# Patient Record
Sex: MALE | Race: BLACK OR AFRICAN AMERICAN | NOT HISPANIC OR LATINO | URBAN - NONMETROPOLITAN AREA
[De-identification: names, ages, dates, MRNs, and addresses within clinical notes are randomized per-mention and may not be internally consistent; named-entity substitution may affect disease eponyms.]

---

## 2019-01-13 ENCOUNTER — HOSPITAL ENCOUNTER (EMERGENCY)
Facility: HOSPITAL | Age: 29
Discharge: HOME | End: 2019-01-13
Attending: EMERGENCY MEDICINE | Admitting: EMERGENCY MEDICINE

## 2019-01-13 VITALS
RESPIRATION RATE: 18 BRPM | HEART RATE: 85 BPM | OXYGEN SATURATION: 98 % | HEIGHT: 68 IN | TEMPERATURE: 97.7 F | DIASTOLIC BLOOD PRESSURE: 69 MMHG | SYSTOLIC BLOOD PRESSURE: 127 MMHG | WEIGHT: 228.4 LBS | BODY MASS INDEX: 34.62 KG/M2

## 2019-01-13 DIAGNOSIS — J02.9 VIRAL PHARYNGITIS: Primary | ICD-10-CM

## 2019-01-13 LAB — S PYO AG THROAT QL: NEGATIVE

## 2019-01-13 PROCEDURE — 87880 STREP A ASSAY W/OPTIC: CPT | Performed by: EMERGENCY MEDICINE

## 2019-01-13 PROCEDURE — 87081 CULTURE SCREEN ONLY: CPT | Performed by: EMERGENCY MEDICINE

## 2019-01-13 PROCEDURE — 99283 EMERGENCY DEPT VISIT LOW MDM: CPT

## 2019-01-13 NOTE — DISCHARGE INSTRUCTIONS
Follow up with one of the [facility] physician groups below to setup primary care. If you have trouble following up, please call the [facility] Nurse Line at [phone]    (Dr. Guanako Pandey DO, Dr. Jason Elaine DO,  PERI Ruelas, and PERI Delaney)  [facility], Primary Care   [address] [address], [address], [place], [place] [address] [phone]     (Dr. Bonnie Tejeda MD, PERI Nunez, and PERI Ortiz)  [facility], Primary Care   [address] [address], [place] [address] [phone]    (Dr. Gautam Verdugo MD and Dr. Fletcher Hayes MD)  [facility], Primary Care  [address], [address] [phone]    (Dr. Velasquez Humphreys MD)  [facility], Primary Care  [address], [address], [place], [place], [address] [phone]

## 2019-01-13 NOTE — ED PROVIDER NOTES
Subjective   Patient is a 28-year-old male who presents to the ER with a sore throat.  Patient states he has had a sore throat for the last week.  Patient says he has also had a very mild associated cough.  Patient states his throat mainly hurts with eating and drinking.  He is able to swallow without difficulty though.  He denies any fever, chills, chest pain, shortness of air, abdominal pain, nausea vomiting diarrhea, urinary changes, neurological changes, trouble breathing or swallowing, sick contacts, voice changes.  Patient has no other concerns.            Review of Systems   Constitutional: Negative.    HENT: Positive for sore throat.    Eyes: Negative.    Respiratory: Negative.    Cardiovascular: Negative.    Gastrointestinal: Negative.    Endocrine: Negative.    Genitourinary: Negative.    Musculoskeletal: Negative.    Skin: Negative.    Allergic/Immunologic: Negative.    Neurological: Negative.    Hematological: Negative.    Psychiatric/Behavioral: Negative.    All other systems reviewed and are negative.      History reviewed. No pertinent past medical history.    No Known Allergies    History reviewed. No pertinent surgical history.    History reviewed. No pertinent family history.    Social History     Socioeconomic History   • Marital status: Single     Spouse name: Not on file   • Number of children: Not on file   • Years of education: Not on file   • Highest education level: Not on file   Tobacco Use   • Smoking status: Current Every Day Smoker   Substance and Sexual Activity   • Alcohol use: No     Frequency: Never   • Drug use: No           Objective   Physical Exam   Constitutional: He is oriented to person, place, and time. He appears well-developed and well-nourished.   HENT:   Head: Normocephalic and atraumatic.   Right Ear: Tympanic membrane and ear canal normal.   Left Ear: Tympanic membrane and ear canal normal.   Mouth/Throat: No trismus in the jaw. No uvula swelling. Posterior  oropharyngeal erythema present. No oropharyngeal exudate, posterior oropharyngeal edema or tonsillar abscesses.   No drooling or voice changes   Eyes: Conjunctivae are normal. Pupils are equal, round, and reactive to light.   Neck: Normal range of motion.   Cardiovascular: Normal rate, regular rhythm and normal heart sounds.   Pulmonary/Chest: Effort normal and breath sounds normal.   Abdominal: Soft. There is no tenderness.   Musculoskeletal: Normal range of motion. He exhibits no edema or deformity.   Neurological: He is alert and oriented to person, place, and time. He has normal strength.   Skin: Skin is warm.   Psychiatric: He has a normal mood and affect. His behavior is normal.   Nursing note and vitals reviewed.      Procedures           ED Course      Strep screen was performed and was negative.  Workup c/w viral pharyngitis.  Patient will be discharged home to follow-up with PCP.  Return for any fevers, trouble breathing or swallowing, voice changes or worsening or new symptoms.            MDM      Final diagnoses:   Viral pharyngitis            Vivian Underwood MD  01/13/19 8595

## 2019-01-15 LAB — BACTERIA SPEC AEROBE CULT: NORMAL

## 2020-07-21 ENCOUNTER — OFFICE VISIT (OUTPATIENT)
Age: 30
End: 2020-07-21

## 2020-07-21 VITALS — OXYGEN SATURATION: 96 % | TEMPERATURE: 97.4 F | HEART RATE: 98 BPM

## 2020-07-23 LAB
REPORT: ABNORMAL
SARS-COV-2: DETECTED
THIS TEST SENT TO: ABNORMAL

## 2020-07-24 ENCOUNTER — TELEPHONE (OUTPATIENT)
Age: 30
End: 2020-07-24

## 2020-07-24 NOTE — TELEPHONE ENCOUNTER
Pt called wanting to know results, and to see if we could fax results and work note to employer. Faxed note over twice and employer ines called and didn't receive it. so read what note said to [facility] and told her when pt is able to get out of quarantine then he came come get a copy of work note.

## 2020-08-04 ENCOUNTER — OFFICE VISIT (OUTPATIENT)
Age: 30
End: 2020-08-04

## 2020-08-04 VITALS — HEART RATE: 84 BPM | TEMPERATURE: 97.4 F | OXYGEN SATURATION: 96 %

## 2020-08-05 ENCOUNTER — OFFICE VISIT (OUTPATIENT)
Age: 30
End: 2020-08-05

## 2020-08-05 VITALS — TEMPERATURE: 98.7 F | HEART RATE: 88 BPM | OXYGEN SATURATION: 94 %

## 2020-08-06 LAB — SARS-COV-2, NAA: NOT DETECTED

## 2020-08-07 LAB — SARS-COV-2, NAA: NOT DETECTED

## 2020-12-12 ENCOUNTER — HOSPITAL ENCOUNTER (EMERGENCY)
Facility: HOSPITAL | Age: 30
Discharge: LEFT BEFORE DISCHARGE | End: 2020-12-13

## 2020-12-12 VITALS
RESPIRATION RATE: 18 BRPM | DIASTOLIC BLOOD PRESSURE: 101 MMHG | SYSTOLIC BLOOD PRESSURE: 169 MMHG | HEIGHT: 68 IN | BODY MASS INDEX: 35.16 KG/M2 | OXYGEN SATURATION: 97 % | TEMPERATURE: 97.8 F | HEART RATE: 88 BPM | WEIGHT: 232 LBS

## 2022-01-12 ENCOUNTER — LAB (OUTPATIENT)
Dept: LAB | Facility: HOSPITAL | Age: 32
End: 2022-01-12

## 2022-01-12 ENCOUNTER — TELEPHONE (OUTPATIENT)
Dept: URGENT CARE | Facility: CLINIC | Age: 32
End: 2022-01-12

## 2022-01-12 DIAGNOSIS — Z20.822 ENCOUNTER FOR LABORATORY TESTING FOR COVID-19 VIRUS: ICD-10-CM

## 2022-01-12 DIAGNOSIS — Z20.822 ENCOUNTER FOR LABORATORY TESTING FOR COVID-19 VIRUS: Primary | ICD-10-CM

## 2022-01-12 LAB — SARS-COV-2 ORF1AB RESP QL NAA+PROBE: DETECTED

## 2022-01-12 PROCEDURE — C9803 HOPD COVID-19 SPEC COLLECT: HCPCS

## 2022-01-12 PROCEDURE — U0004 COV-19 TEST NON-CDC HGH THRU: HCPCS
